# Patient Record
Sex: MALE | Race: WHITE | NOT HISPANIC OR LATINO
[De-identification: names, ages, dates, MRNs, and addresses within clinical notes are randomized per-mention and may not be internally consistent; named-entity substitution may affect disease eponyms.]

---

## 2017-08-17 ENCOUNTER — APPOINTMENT (OUTPATIENT)
Dept: PEDIATRICS | Facility: CLINIC | Age: 22
End: 2017-08-17
Payer: COMMERCIAL

## 2017-08-17 VITALS — DIASTOLIC BLOOD PRESSURE: 70 MMHG | SYSTOLIC BLOOD PRESSURE: 122 MMHG | HEART RATE: 72 BPM

## 2017-08-17 DIAGNOSIS — T51.91XA: ICD-10-CM

## 2017-08-17 DIAGNOSIS — H10.31 UNSPECIFIED ACUTE CONJUNCTIVITIS, RIGHT EYE: ICD-10-CM

## 2017-08-17 DIAGNOSIS — L72.0 EPIDERMAL CYST: ICD-10-CM

## 2017-08-17 PROCEDURE — 96160 PT-FOCUSED HLTH RISK ASSMT: CPT | Mod: 25,59

## 2017-08-17 PROCEDURE — 96127 BRIEF EMOTIONAL/BEHAV ASSMT: CPT

## 2017-08-17 PROCEDURE — 99395 PREV VISIT EST AGE 18-39: CPT

## 2017-08-18 VITALS — HEIGHT: 67.5 IN | BODY MASS INDEX: 27.46 KG/M2 | WEIGHT: 177 LBS

## 2017-08-18 PROBLEM — T51.91XA ALCOHOL POISONING: Status: RESOLVED | Noted: 2017-08-18 | Resolved: 2017-08-18

## 2017-08-18 NOTE — HISTORY OF PRESENT ILLNESS
[Good Dental Hygiene] : Good [Up to Date] : Up to date [No Nutrition Concerns] : nutrition [No Sleep Concerns] : sleep [No School Concerns] : school [Diverse, Healthy Diet] : his current diet is diverse and healthy [None] : No behavior issues identified [Depression Screen] : depression screen [Tobacco Use] : no tobacco use [Alcohol Use] : alcohol use [Marijuana Use] : marijuana use [Illicit Drug Use] : no illicit drug use [Sexual Activity] : sexual activity [Depression Symptoms] : depression symptoms [Good] : good [de-identified] : self [FreeTextEntry5] : Attends [employer] [FreeTextEntry1] : \par s/p flare of chest cyst. s/p excision by derm; on po antibiotic

## 2017-08-18 NOTE — PHYSICAL EXAM
[General Appearance - Well Developed] : interactive [General Appearance - Well-Appearing] : well appearing [General Appearance - In No Acute Distress] : in no acute distress [Appearance Of Head] : the head was normocephalic [Sclera] : the conjunctiva were normal [Outer Ear] : the ears and nose were normal in appearance [Both Tympanic Membranes Were Examined] : both tympanic membranes were normal [Nasal Cavity] : the nasal mucosa and septum were normal [Examination Of The Oral Cavity] : the teeth, gums, and palate were normal [Oropharynx] : the oropharynx was normal  [Neck Cervical Mass (___cm)] : no neck mass was observed [Respiration, Rhythm And Depth] : normal respiratory rhythm and effort [Auscultation Breath Sounds / Voice Sounds] : clear bilateral breath sounds [Heart Rate And Rhythm] : heart rate and rhythm were normal [Heart Sounds] : normal S1 and S2 [Murmurs] : no murmurs [Bowel Sounds] : normal bowel sounds [Abdomen Soft] : soft [Abdomen Tenderness] : non-tender [Abdominal Distention] : nondistended [Musculoskeletal Exam: Normal Movement Of All Extremities] : normal movements of all extremities [Motor Tone] : muscle strength and tone were normal [No Visual Abnormalities] : no visible abnormailities [Deep Tendon Reflexes (DTR)] : deep tendon reflexes were 2+ and symmetric [Generalized Lymph Node Enlargement] : no lymphadenopathy [Skin Color & Pigmentation] : normal skin color and pigmentation [] : no significant rash [Skin Lesions] : no skin lesions [FreeTextEntry1] : bandage over chest wd [Initial Inspection: Infant Active And Alert] : active and alert [Penis Abnormality] : the penis was normal [Scrotum] : the scrotum was normal [Testes Mass (___cm)] : there were no testicular masses [[place] Stage _____] : the [place] stage for pubic hair development was [unfilled]

## 2017-09-07 ENCOUNTER — MESSAGE (OUTPATIENT)
Age: 22
End: 2017-09-07

## 2017-11-17 ENCOUNTER — APPOINTMENT (OUTPATIENT)
Dept: PEDIATRICS | Facility: CLINIC | Age: 22
End: 2017-11-17
Payer: COMMERCIAL

## 2017-11-17 VITALS — TEMPERATURE: 98.3 F

## 2017-11-17 DIAGNOSIS — Z87.09 PERSONAL HISTORY OF OTHER DISEASES OF THE RESPIRATORY SYSTEM: ICD-10-CM

## 2017-11-17 DIAGNOSIS — Z00.00 ENCOUNTER FOR GENERAL ADULT MEDICAL EXAMINATION W/OUT ABNORMAL FINDINGS: ICD-10-CM

## 2017-11-17 DIAGNOSIS — R53.83 OTHER FATIGUE: ICD-10-CM

## 2017-11-17 PROCEDURE — 99214 OFFICE O/P EST MOD 30 MIN: CPT

## 2017-11-17 RX ORDER — FLUTICASONE PROPIONATE 50 UG/1
50 SPRAY, METERED NASAL DAILY
Qty: 1 | Refills: 1 | Status: ACTIVE | COMMUNITY
Start: 2017-11-17 | End: 1900-01-01

## 2017-11-17 RX ORDER — CEFUROXIME AXETIL 500 MG/1
500 TABLET ORAL
Qty: 28 | Refills: 0 | Status: COMPLETED | COMMUNITY
Start: 2017-11-17 | End: 2017-12-01

## 2017-11-18 RX ORDER — MUPIROCIN 20 MG/G
2 OINTMENT TOPICAL
Qty: 22 | Refills: 0 | Status: COMPLETED | COMMUNITY
Start: 2017-08-10

## 2017-11-18 RX ORDER — CEFADROXIL 500 MG/1
500 CAPSULE ORAL
Qty: 20 | Refills: 0 | Status: COMPLETED | COMMUNITY
Start: 2017-08-10

## 2017-11-18 RX ORDER — AMOXICILLIN 875 MG/1
875 TABLET, FILM COATED ORAL
Qty: 20 | Refills: 0 | Status: COMPLETED | COMMUNITY
Start: 2017-10-25

## 2017-11-18 NOTE — DISCUSSION/SUMMARY
[FreeTextEntry1] : Sx's could be related to AR and sinusitis, but seem more excessive than expected with those diagnoses

## 2017-11-18 NOTE — HISTORY OF PRESENT ILLNESS
[Acute] : for an acute visit [Cough] : cough [FreeTextEntry7] : self [FreeTextEntry6] : Pt describes 1-2 mth h/o congestion a/w cough. Has had feelings of SOB, sometimes with poor exercise tolerance. + ST and HA at times. No h/o prolonged fever. + wt gain. Denies sx's of depression currently. Has been able to attend school. Sleeping OK\par  s/p urgent care visit- tx with amoxil for sinusitis few weeks ago; did not help

## 2017-11-18 NOTE — PHYSICAL EXAM
[General Appearance - Well Developed] : interactive [General Appearance - Well-Appearing] : well appearing [General Appearance - In No Acute Distress] : in no acute distress [Appearance Of Head] : the head was normocephalic [Sclera] : the sclera and conjunctiva were normal [PERRL With Normal Accommodation] : pupils were equal in size, round, reactive to light, with normal accommodation [Extraocular Movements] : extraocular movements were intact [Outer Ear] : the ears and nose were normal in appearance [Both Tympanic Membranes Were Examined] : both tympanic membranes were normal [Nasal Cavity] : the nasal mucosa and septum were normal [Examination Of The Oral Cavity] : the teeth, gums, and palate were normal [Oropharynx] : the oropharynx was normal  [Neck Cervical Mass (___cm)] : no neck mass was observed [Respiration, Rhythm And Depth] : normal respiratory rhythm and effort [Auscultation Breath Sounds / Voice Sounds] : clear bilateral breath sounds [Heart Rate And Rhythm] : heart rate and rhythm were normal [Heart Sounds] : normal S1 and S2 [Murmurs] : no murmurs [Bowel Sounds] : normal bowel sounds [Abdomen Soft] : soft [Abdomen Tenderness] : non-tender [Abdominal Distention] : nondistended [] : no hepato-splenomegaly [Cervical Lymph Nodes Enlarged Posterior Bilaterally] : posterior cervical [Cervical Lymph Nodes Enlarged Anterior Bilaterally] : anterior cervical [Supraclavicular Lymph Nodes Enlarged Bilaterally] : supraclavicular [Initial Inspection: Infant Active And Alert] : active and alert [Penis Abnormality] : the penis was normal [Scrotum] : the scrotum was normal [Testes Mass (___cm)] : there were no testicular masses

## 2017-11-20 ENCOUNTER — MESSAGE (OUTPATIENT)
Age: 22
End: 2017-11-20

## 2017-11-20 LAB
ALBUMIN SERPL ELPH-MCNC: 4.5 G/DL
ALP BLD-CCNC: 52 U/L
ALT SERPL-CCNC: 20 U/L
ANION GAP SERPL CALC-SCNC: 12 MMOL/L
AST SERPL-CCNC: 19 U/L
BASOPHILS # BLD AUTO: 0.04 K/UL
BASOPHILS NFR BLD AUTO: 0.6 %
BILIRUB SERPL-MCNC: 0.2 MG/DL
BUN SERPL-MCNC: 17 MG/DL
CALCIUM SERPL-MCNC: 9.3 MG/DL
CHLORIDE SERPL-SCNC: 105 MMOL/L
CHOLEST SERPL-MCNC: 179 MG/DL
CHOLEST/HDLC SERPL: 3.7 RATIO
CO2 SERPL-SCNC: 29 MMOL/L
CREAT SERPL-MCNC: 1.01 MG/DL
EBV EA AB SER IA-ACNC: <5 U/ML
EBV EA AB TITR SER IF: POSITIVE
EBV EA IGG SER QL IA: 181 U/ML
EBV EA IGG SER-ACNC: NEGATIVE
EBV EA IGM SER IA-ACNC: NEGATIVE
EBV PATRN SPEC IB-IMP: NORMAL
EBV VCA IGG SER IA-ACNC: 74.4 U/ML
EBV VCA IGM SER QL IA: <10 U/ML
EOSINOPHIL # BLD AUTO: 0.1 K/UL
EOSINOPHIL NFR BLD AUTO: 1.6
EPSTEIN-BARR VIRUS CAPSID ANTIGEN IGG: POSITIVE
GLUCOSE SERPL-MCNC: 91 MG/DL
HCT VFR BLD CALC: 42.1 %
HDLC SERPL-MCNC: 48 MG/DL
HGB BLD-MCNC: 14 G/DL
IMM GRANULOCYTES NFR BLD AUTO: 0.2 %
LDLC SERPL CALC-MCNC: 86 MG/DL
LYMPHOCYTES # BLD AUTO: 1.11 K/UL
LYMPHOCYTES NFR BLD AUTO: 17.8 %
MAN DIFF?: NORMAL
MCHC RBC-ENTMCNC: 30.2 PG
MCHC RBC-ENTMCNC: 33.3 GM/DL
MCV RBC AUTO: 90.7 FL
MONOCYTES # BLD AUTO: 0.5 K/UL
MONOCYTES NFR BLD AUTO: 8 %
NEUTROPHILS # BLD AUTO: 4.46 K/UL
NEUTROPHILS NFR BLD AUTO: 71.8 %
PLATELET # BLD AUTO: 214 K/UL
POTASSIUM SERPL-SCNC: 4.4 MMOL/L
PROT SERPL-MCNC: 7.4 G/DL
RBC # BLD: 4.64 M/UL
RBC # FLD: 13 %
SODIUM SERPL-SCNC: 146 MMOL/L
T4 FREE SERPL-MCNC: 1.1 NG/DL
TRIGL SERPL-MCNC: 226 MG/DL
TSH SERPL-ACNC: 1.6 UIU/ML
WBC # FLD AUTO: 6.22 K/UL

## 2017-11-27 ENCOUNTER — MESSAGE (OUTPATIENT)
Age: 22
End: 2017-11-27

## 2020-12-15 PROBLEM — Z87.09 HISTORY OF ACUTE SINUSITIS: Status: RESOLVED | Noted: 2017-11-17 | Resolved: 2020-12-15
